# Patient Record
Sex: MALE | Race: WHITE | Employment: OTHER | URBAN - NONMETROPOLITAN AREA
[De-identification: names, ages, dates, MRNs, and addresses within clinical notes are randomized per-mention and may not be internally consistent; named-entity substitution may affect disease eponyms.]

---

## 2018-04-04 ENCOUNTER — HOSPITAL ENCOUNTER (EMERGENCY)
Facility: HOSPITAL | Age: 73
Discharge: ANOTHER HOSPITAL | End: 2018-04-05
Attending: INTERNAL MEDICINE | Admitting: INTERNAL MEDICINE
Payer: COMMERCIAL

## 2018-04-04 DIAGNOSIS — F03.91 DEMENTIA WITH BEHAVIORAL DISTURBANCE, UNSPECIFIED DEMENTIA TYPE: ICD-10-CM

## 2018-04-04 LAB
ALBUMIN SERPL-MCNC: 3.9 G/DL (ref 3.4–5)
ALBUMIN UR-MCNC: 10 MG/DL
ALP SERPL-CCNC: 92 U/L (ref 40–150)
ALT SERPL W P-5'-P-CCNC: 24 U/L (ref 0–70)
AMPHETAMINES UR QL SCN: NEGATIVE
ANION GAP SERPL CALCULATED.3IONS-SCNC: 10 MMOL/L (ref 3–14)
APPEARANCE UR: CLEAR
AST SERPL W P-5'-P-CCNC: 16 U/L (ref 0–45)
BACTERIA #/AREA URNS HPF: ABNORMAL /HPF
BARBITURATES UR QL: NEGATIVE
BASOPHILS # BLD AUTO: 0 10E9/L (ref 0–0.2)
BASOPHILS NFR BLD AUTO: 0.3 %
BENZODIAZ UR QL: NEGATIVE
BILIRUB SERPL-MCNC: 1.1 MG/DL (ref 0.2–1.3)
BILIRUB UR QL STRIP: NEGATIVE
BUN SERPL-MCNC: 20 MG/DL (ref 7–30)
CALCIUM SERPL-MCNC: 9.8 MG/DL (ref 8.5–10.1)
CANNABINOIDS UR QL SCN: NEGATIVE
CHLORIDE SERPL-SCNC: 100 MMOL/L (ref 94–109)
CO2 SERPL-SCNC: 25 MMOL/L (ref 20–32)
COCAINE UR QL: NEGATIVE
COLOR UR AUTO: ABNORMAL
CREAT SERPL-MCNC: 1.05 MG/DL (ref 0.66–1.25)
DIFFERENTIAL METHOD BLD: ABNORMAL
EOSINOPHIL # BLD AUTO: 0.2 10E9/L (ref 0–0.7)
EOSINOPHIL NFR BLD AUTO: 1.8 %
ERYTHROCYTE [DISTWIDTH] IN BLOOD BY AUTOMATED COUNT: 11.9 % (ref 10–15)
ETHANOL SERPL-MCNC: <0.01 G/DL
GFR SERPL CREATININE-BSD FRML MDRD: 69 ML/MIN/1.7M2
GLUCOSE SERPL-MCNC: 333 MG/DL (ref 70–99)
GLUCOSE UR STRIP-MCNC: >1000 MG/DL
HCT VFR BLD AUTO: 38.9 % (ref 40–53)
HGB BLD-MCNC: 14 G/DL (ref 13.3–17.7)
HGB UR QL STRIP: NEGATIVE
IMM GRANULOCYTES # BLD: 0 10E9/L (ref 0–0.4)
IMM GRANULOCYTES NFR BLD: 0.2 %
KETONES UR STRIP-MCNC: 10 MG/DL
LEUKOCYTE ESTERASE UR QL STRIP: NEGATIVE
LYMPHOCYTES # BLD AUTO: 2 10E9/L (ref 0.8–5.3)
LYMPHOCYTES NFR BLD AUTO: 21.2 %
MCH RBC QN AUTO: 31 PG (ref 26.5–33)
MCHC RBC AUTO-ENTMCNC: 36 G/DL (ref 31.5–36.5)
MCV RBC AUTO: 86 FL (ref 78–100)
METHADONE UR QL SCN: NEGATIVE
MONOCYTES # BLD AUTO: 0.7 10E9/L (ref 0–1.3)
MONOCYTES NFR BLD AUTO: 7 %
MUCOUS THREADS #/AREA URNS LPF: PRESENT /LPF
NEUTROPHILS # BLD AUTO: 6.5 10E9/L (ref 1.6–8.3)
NEUTROPHILS NFR BLD AUTO: 69.5 %
NITRATE UR QL: NEGATIVE
NRBC # BLD AUTO: 0 10*3/UL
NRBC BLD AUTO-RTO: 0 /100
OPIATES UR QL SCN: NEGATIVE
PCP UR QL SCN: NEGATIVE
PH UR STRIP: 5.5 PH (ref 4.7–8)
PLATELET # BLD AUTO: 201 10E9/L (ref 150–450)
POTASSIUM SERPL-SCNC: 4.2 MMOL/L (ref 3.4–5.3)
PROT SERPL-MCNC: 8 G/DL (ref 6.8–8.8)
RBC # BLD AUTO: 4.52 10E12/L (ref 4.4–5.9)
RBC #/AREA URNS AUTO: <1 /HPF (ref 0–2)
SODIUM SERPL-SCNC: 135 MMOL/L (ref 133–144)
SOURCE: ABNORMAL
SP GR UR STRIP: 1.02 (ref 1–1.03)
SPERM #/AREA URNS HPF: PRESENT /HPF
UROBILINOGEN UR STRIP-MCNC: NORMAL MG/DL (ref 0–2)
WBC # BLD AUTO: 9.3 10E9/L (ref 4–11)
WBC #/AREA URNS AUTO: 1 /HPF (ref 0–5)

## 2018-04-04 PROCEDURE — 25000132 ZZH RX MED GY IP 250 OP 250 PS 637: Performed by: INTERNAL MEDICINE

## 2018-04-04 PROCEDURE — 99285 EMERGENCY DEPT VISIT HI MDM: CPT | Performed by: INTERNAL MEDICINE

## 2018-04-04 PROCEDURE — 80307 DRUG TEST PRSMV CHEM ANLYZR: CPT | Performed by: INTERNAL MEDICINE

## 2018-04-04 PROCEDURE — 81001 URINALYSIS AUTO W/SCOPE: CPT | Mod: 59 | Performed by: INTERNAL MEDICINE

## 2018-04-04 PROCEDURE — 99285 EMERGENCY DEPT VISIT HI MDM: CPT

## 2018-04-04 PROCEDURE — 36415 COLL VENOUS BLD VENIPUNCTURE: CPT | Performed by: INTERNAL MEDICINE

## 2018-04-04 PROCEDURE — 85025 COMPLETE CBC W/AUTO DIFF WBC: CPT | Performed by: INTERNAL MEDICINE

## 2018-04-04 PROCEDURE — 80053 COMPREHEN METABOLIC PANEL: CPT | Performed by: INTERNAL MEDICINE

## 2018-04-04 PROCEDURE — 80320 DRUG SCREEN QUANTALCOHOLS: CPT | Performed by: INTERNAL MEDICINE

## 2018-04-04 RX ORDER — LORAZEPAM 1 MG/1
1 TABLET ORAL ONCE
Status: COMPLETED | OUTPATIENT
Start: 2018-04-04 | End: 2018-04-04

## 2018-04-04 RX ADMIN — LORAZEPAM 1 MG: 1 TABLET ORAL at 21:42

## 2018-04-04 NOTE — ED AVS SNAPSHOT
[facility]    [address] [place] [address]    Phone:  [phone]                                       Mike Ramos   MRN: [id number]    Department:  [facility]   Date of Visit:  4/4/2018           After Visit Summary Signature Page     I have received my discharge instructions, and my questions have been answered. I have discussed any challenges I see with this plan with the nurse or doctor.    ..........................................................................................................................................  Patient/Patient Representative Signature      ..........................................................................................................................................  Patient Representative Print Name and Relationship to Patient    ..................................................               ................................................  Date                                            Time    ..........................................................................................................................................  Reviewed by Signature/Title    ...................................................              ..............................................  Date                                                            Time

## 2018-04-04 NOTE — ED AVS SNAPSHOT
[facility]    [address] [place], [place] [address] [address]    Phone:  [phone]                                       Mike Ramos   MRN: [id number]    Department:  [facility]   Date of Visit:  4/4/2018           Patient Information     Date Of Birth          1945        Your diagnoses for this visit were:     Dementia with behavioral disturbance, unspecified dementia type     Uncontrolled type 2 diabetes mellitus without complication, without long-term current use of insulin (H)        You were seen by Ludin Villatoro MD.      Follow-up Information     Schedule an appointment as soon as possible for a visit with Clinic, [facility].    Contact information:    [address]  [facility] [address]  [phone]          Discharge Instructions         Understanding Dementia  Dementia is the name for a group of brain conditions that make it harder to remember, reason, and communicate. The most common form of dementia is Alzheimer disease. Other types include vascular dementia, frontotemporal dementia, and Lewy body dementia. Years ago, dementia was often called  senility.  It was even thought to be a normal part of aging. We now know that it s not normal. It s caused by ongoing damage to cells in the brain.    Symptoms of dementia  Symptoms differ depending on which parts of the brain are affected and the stage of the disease. The most common symptoms include:    Memory loss, including trouble with directions and familiar tasks    Language problems, such as trouble getting words out or understanding what is said    Difficulty with planning, organizing, concentration, and judgment. This includes people not being able to recognize their own symptoms.    Changes in behavior and personality  How dementia affects the brain  The brain controls all the workings of the mind and body. Some parts of the brain control memory and language. Other parts control movement and  coordination. With dementia, nerve cells in the brain are gradually damaged or destroyed. Why this happens is not yet clear. But over time, parts of the brain begin to atrophy (shrink). Brain atrophy often starts in the part of the brain that controls memory, reasoning, and personality. Other parts of the brain may not be affected until much later in the illness.  The stages of dementia  Dementia is a progressive disease. This means it gets worse over time. Symptoms differ for each person, but there are 3 basic stages. Each may last from months to years:    In the early stage, a person may seem forgetful, confused, or have changes in behavior. However, he or she may still be able to handle most tasks without help.    In the middle stage, more and more help is needed with daily tasks. A person may have trouble recognizing friends and family members, wander, or get lost in familiar places. He or she may also become restless or castle.    In the late stage, Alzheimer s can cause severe problems with memory, judgment, and other skills. Help is needed with nearly every aspect of daily life.  Treating dementia  At present, there s no cure for dementia. But with proper care, many people can live comfortably for years:     Medicines are a key part of treatment. Some types can help slow the progression of symptoms, such as memory loss. Others can help ease mood, behavior, and sleep problems. These medicines work for some people but not all.    Activity and exercise are good for body and mind. They may even help slow the progression of the disease. Simple, repetitive activities are good choices.    Regular healthcare provider visits help keep track of symptoms and overall health.    Sleep-wake cycle can be mixed up in patients with dementia. They may function better being up at nighttime and sleeping during the daytime.      Social interactions are important to maintain.    Date Last Reviewed: 10/7/2015    6380-6209 The  [employer]. [address] [place] [address]. All rights reserved. This information is not intended as a substitute for professional medical care. Always follow your healthcare professional's instructions.             Review of your medicines      Our records show that you are taking the medicines listed below. If these are incorrect, please call your family doctor or clinic.        Dose / Directions Last dose taken    CITALOPRAM HYDROBROMIDE PO   Dose:  10 mg        Take 10 mg by mouth daily   Refills:  0        insulin glargine 100 UNIT/ML injection   Commonly known as:  LANTUS        Inject Subcutaneous At Bedtime 15-25 units daily   Refills:  0        LISINOPRIL PO   Dose:  10 mg        Take 10 mg by mouth daily   Refills:  0        METFORMIN HCL PO   Dose:  1000 mg        Take 1,000 mg by mouth 2 times daily (with meals)   Refills:  0        METOPROLOL TARTRATE PO   Dose:  25 mg        Take 25 mg by mouth 2 times daily   Refills:  0        NITROSTAT SL   Dose:  0.4 mg        Place 0.4 mg under the tongue   Refills:  0        RISPERIDONE PO   Dose:  0.5 mg        Take 0.5 mg by mouth 2 times daily   Refills:  0        SIMVASTATIN PO   Dose:  20 mg        Take 20 mg by mouth At Bedtime   Refills:  0        SLO-NIACIN PO   Dose:  2000 mg        Take 2,000 mg by mouth At Bedtime   Refills:  0        VITAMIN D3 PO   Dose:  1000 Units        Take 1,000 Units by mouth daily   Refills:  0                Procedures and tests performed during your visit     Alcohol ethyl    CBC with platelets differential    Comprehensive metabolic panel    Drug Screen Urine (Range)    UA with Microscopic reflex to Culture      Orders Needing Specimen Collection     None      Pending Results     No orders found for last 3 day(s).            Pending Culture Results     No orders found for last 3 day(s).            Thank you for choosing [facility]       Thank you for choosing [facility] for your care. Our goal is always to  "provide you with excellent care. Hearing back from our patients is one way we can continue to improve our services. Please take a few minutes to complete the written survey that you may receive in the mail after you visit with us. Thank you!        MyPermissions Information     MyPermissions lets you send messages to your doctor, view your test results, renew your prescriptions, schedule appointments and more. To sign up, go to [email or web address] . Click on \"Log in\" on the left side of the screen, which will take you to the Welcome page. Then click on \"Sign up Now\" on the right side of the page.     You will be asked to enter the access code listed below, as well as some personal information. Please follow the directions to create your username and password.     Your access code is: [email or web address]  Expires: 7/3/2018 11:53 PM     Your access code will  in 90 days. If you need help or a new code, please call your [facility] clinic or [phone].        Care EveryWhere ID     This is your Care EveryWhere ID. This could be used by other organizations to access your [facility] medical records  [id number]        Equal Access to Services     ESTEFANIA AZEVEDO : Hadii reny Ortiz, wanilda abad, qalyndon kaalmajuan cobb, jovanna freedman. So [facility] [phone].    ATENCIÓN: Si habla español, tiene a calvert disposición servicios gratuitos de asistencia lingüística. Ghassan al [phone].    We comply with applicable federal civil rights laws and [place] laws. We do not discriminate on the basis of race, color, national origin, age, disability, sex, sexual orientation, or gender identity.            After Visit Summary       This is your record. Keep this with you and show to your community pharmacist(s) and doctor(s) at your next visit.                  "

## 2018-04-05 ENCOUNTER — TRANSFERRED RECORDS (OUTPATIENT)
Dept: HEALTH INFORMATION MANAGEMENT | Facility: CLINIC | Age: 73
End: 2018-04-05

## 2018-04-05 VITALS
OXYGEN SATURATION: 97 % | SYSTOLIC BLOOD PRESSURE: 128 MMHG | RESPIRATION RATE: 16 BRPM | DIASTOLIC BLOOD PRESSURE: 71 MMHG | HEART RATE: 68 BPM | TEMPERATURE: 97.9 F

## 2018-04-05 LAB
GLUCOSE BLDC GLUCOMTR-MCNC: 271 MG/DL (ref 70–99)
GLUCOSE BLDC GLUCOMTR-MCNC: 279 MG/DL (ref 70–99)
GLUCOSE BLDC GLUCOMTR-MCNC: 290 MG/DL (ref 70–99)

## 2018-04-05 PROCEDURE — 00000146 ZZHCL STATISTIC GLUCOSE BY METER IP

## 2018-04-05 RX ORDER — ALUMINA, MAGNESIA, AND SIMETHICONE 2400; 2400; 240 MG/30ML; MG/30ML; MG/30ML
30 SUSPENSION ORAL ONCE
Status: DISCONTINUED | OUTPATIENT
Start: 2018-04-05 | End: 2018-04-05

## 2018-04-05 ASSESSMENT — ENCOUNTER SYMPTOMS
DISORGANIZED SPEECH: 0
DIAPHORESIS: 0
DIZZINESS: 0
WHEEZING: 0
BACK PAIN: 0
SLEEP DISTURBANCE: 0
HEADACHES: 0
WEAKNESS: 0
VOMITING: 0
BLOOD IN STOOL: 0
HALLUCINATIONS: 0
NECK PAIN: 0
NERVOUS/ANXIOUS: 1
FLANK PAIN: 0
NAUSEA: 0
ANAL BLEEDING: 0
HOMICIDAL IDEAS: 0
FEVER: 0
ABDOMINAL PAIN: 0
LIGHT-HEADEDNESS: 0
CHILLS: 0
PALPITATIONS: 0
SHORTNESS OF BREATH: 0
NUMBNESS: 0
COUGH: 0
COLOR CHANGE: 0
FREQUENCY: 0
VOICE CHANGE: 0
CONFUSION: 0
AGITATION: 1
DYSURIA: 0
MYALGIAS: 0
DEPRESSED MOOD: 0
PARANOIA: 1
CHEST TIGHTNESS: 0
ABDOMINAL DISTENTION: 0
AGGRESSION: 1

## 2018-04-05 NOTE — ED NOTES
Bed: ED06  Expected date:   Expected time:   Means of arrival:   Comments:  [facility]

## 2018-04-05 NOTE — ED NOTES
This writer [facility] a [facility] Fax number [phone]

## 2018-04-05 NOTE — ED NOTES
This writer faxed a tranfser summary, face sheet, and run report to [facility] to see if they can take the pt .

## 2018-04-05 NOTE — ED NOTES
[facility] stating that [facility] to [facility] to their facility d/t not having a bed suitable for pt. Central Intake and DEC notified on the need for admission.

## 2018-04-05 NOTE — ED PROVIDER NOTES
Patient accepted in the Reflections unit at [facility] for medication adjustment and care.      See Dr. Villatoro's note for diagnoses.     Jessica Proctor MD  04/05/18 9349

## 2018-04-05 NOTE — PROGRESS NOTES
Referral received.  Patient admitted due to agitation and non managed behavior at home.  A DEC assessement and identified no need for a mental health admission.  Wife returned and response and behavior became unmanageable.  He remained here.  Call placed to referral line and I [facility]  [facility].  I did review this patient with central intake, Nisha.  I did speak directly with Alex, the concern that remains for this particular patient is the inability to participate in groups and his vulnerability within the unit.  I did hear back from [facility]isha who did speak with [facility] who also denied for the same reasons.  I have called Chelsea-to the reflections unit, information referred to Jolynn.  His [facility] would [facility] to the [facility] per case management.  This however does not mean there is coverage.  I spoke and sent information to Kirk at [facility].  I was told that he would have his   There is the plan this patient is able to go to  A skilled contracted facility.  [facility] is [facility].    Continue to try to refer and transfer.
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
## 2018-04-05 NOTE — ED NOTES
I called and talked with wife about  being discharged to home and she states that she doesn't think he should be discharged and I told her that I would try and talk with the DEC  that talked with her  and then I would call her back.  I then tried calling the  and they state that she has gone home for the night.

## 2018-04-05 NOTE — ED NOTES
Went to explain to patient about DEC.  Patient upset and does not want anyone caring or talking to him from [place].  Patient said he wants to go home or stay the night here and wait for a white doctor to care for him in the morning.  Patient was yelling.  Condition reported to Dr. Villatoro.  Wait for DEC to assess patient.

## 2018-04-05 NOTE — ED NOTES
Call is made to [facility] regarding possible room for this patient. Did give history of patient to staff and she states that she would pass this on to their admitting staff and see if they have a room available or if they would be a good place for him to go. They did state that they are no a locked unit and patient is free to walk around. She also states that they would need to talk to the patient over the phone before they could make a decision. Plan is to wait until morning since patient is currently resting and they are passing along the information. Provider updated.

## 2018-04-05 NOTE — PROGRESS NOTES
I did talk with patient to share with him where we are in the status of continued care.  Lunch was ordered for him.  I have heard back from [facility] and provided additional numbers for her to transition care.  Tigre on the phone for a nurse to nurse update.
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
## 2018-04-05 NOTE — DISCHARGE INSTRUCTIONS
Understanding Dementia  Dementia is the name for a group of brain conditions that make it harder to remember, reason, and communicate. The most common form of dementia is Alzheimer disease. Other types include vascular dementia, frontotemporal dementia, and Lewy body dementia. Years ago, dementia was often called  senility.  It was even thought to be a normal part of aging. We now know that it s not normal. It s caused by ongoing damage to cells in the brain.    Symptoms of dementia  Symptoms differ depending on which parts of the brain are affected and the stage of the disease. The most common symptoms include:    Memory loss, including trouble with directions and familiar tasks    Language problems, such as trouble getting words out or understanding what is said    Difficulty with planning, organizing, concentration, and judgment. This includes people not being able to recognize their own symptoms.    Changes in behavior and personality  How dementia affects the brain  The brain controls all the workings of the mind and body. Some parts of the brain control memory and language. Other parts control movement and coordination. With dementia, nerve cells in the brain are gradually damaged or destroyed. Why this happens is not yet clear. But over time, parts of the brain begin to atrophy (shrink). Brain atrophy often starts in the part of the brain that controls memory, reasoning, and personality. Other parts of the brain may not be affected until much later in the illness.  The stages of dementia  Dementia is a progressive disease. This means it gets worse over time. Symptoms differ for each person, but there are 3 basic stages. Each may last from months to years:    In the early stage, a person may seem forgetful, confused, or have changes in behavior. However, he or she may still be able to handle most tasks without help.    In the middle stage, more and more help is needed with daily tasks. A person may have  trouble recognizing friends and family members, wander, or get lost in familiar places. He or she may also become restless or castle.    In the late stage, Alzheimer s can cause severe problems with memory, judgment, and other skills. Help is needed with nearly every aspect of daily life.  Treating dementia  At present, there s no cure for dementia. But with proper care, many people can live comfortably for years:     Medicines are a key part of treatment. Some types can help slow the progression of symptoms, such as memory loss. Others can help ease mood, behavior, and sleep problems. These medicines work for some people but not all.    Activity and exercise are good for body and mind. They may even help slow the progression of the disease. Simple, repetitive activities are good choices.    Regular healthcare provider visits help keep track of symptoms and overall health.    Sleep-wake cycle can be mixed up in patients with dementia. They may function better being up at nighttime and sleeping during the daytime.      Social interactions are important to maintain.    Date Last Reviewed: 10/7/2015    2868-7484 [employer]. [address] [place] [address], [place] [address]. All rights reserved. This information is not intended as a substitute for professional medical care. Always follow your healthcare professional's instructions.

## 2018-04-05 NOTE — ED NOTES
has placed a note in the computer regarding her talking with patient, her talking with ER provider, and their plan. It also mentions that she had called and talked with wife.  I then call wife back and read then plan that they have listed in his chart about him having an appointment scheduled for the 11th and that he is to call them in the morning and see if they can have that appoinment sooner. Wife states understanding and that she will be on her way to  her .

## 2018-04-05 NOTE — PROGRESS NOTES
[facility] [place].  [facility]  Numbers provided to the receiving facility.

## 2018-04-05 NOTE — ED NOTES
Wife arrives and goes into room. Provider then goes into room to talk with wife and patient. Patient then starts yelling and provider comes back out of room and tells me he we will talk to wife in consult room. I then go down to room and ask wife to step out of patient room and she does. I then ask her to go to consult room and she agrees. I then walk her down to consult room and provider then goes in to talk with her. Security is near by also.

## 2018-04-05 NOTE — ED NOTES
Went in to talk with patient and update him that [facility] are [facility]. Instructed him that we sent the information they requested and we are waiting to hear back from them. He states understanding.

## 2018-04-05 NOTE — ED NOTES
Message left with [facility] in [place] to contact Dr. Villatoro regarding a possible referral to [facility].

## 2018-04-20 ENCOUNTER — TRANSFERRED RECORDS (OUTPATIENT)
Dept: HEALTH INFORMATION MANAGEMENT | Facility: CLINIC | Age: 73
End: 2018-04-20

## 2018-04-25 ENCOUNTER — TELEPHONE (OUTPATIENT)
Dept: FAMILY MEDICINE | Facility: OTHER | Age: 73
End: 2018-04-25

## 2018-04-25 NOTE — TELEPHONE ENCOUNTER
[facility] Fine.  [phone].  Patient had followed with him for quite some time.  History of PTSD, lability, outbursts.  Was hospitalized, then placed in nursing home - [facility].  Had been with wife prior.  Currently on Duloxetine and Risperdal.  Prior Depakote not helpful.  Dr. Fine suggested Prazosin if behaviors recur.  Felt he may do better in structured environment.  Patient decided he did not want to see him anymore.  Doctor is available if needed.  I have not yet met patient.  Will be rounding next month.

## 2018-04-27 VITALS
RESPIRATION RATE: 18 BRPM | DIASTOLIC BLOOD PRESSURE: 78 MMHG | OXYGEN SATURATION: 100 % | SYSTOLIC BLOOD PRESSURE: 122 MMHG | TEMPERATURE: 97.7 F | HEART RATE: 78 BPM

## 2018-04-27 PROBLEM — F02.818 DEMENTIA DUE TO MEDICAL CONDITION WITH BEHAVIORAL DISTURBANCE (H): Status: ACTIVE | Noted: 2018-04-27

## 2018-04-27 PROBLEM — I10 BENIGN ESSENTIAL HYPERTENSION: Status: ACTIVE | Noted: 2018-04-27

## 2018-04-27 RX ORDER — TRAZODONE HYDROCHLORIDE 50 MG/1
50 TABLET, FILM COATED ORAL AT BEDTIME
COMMUNITY
End: 2018-06-08

## 2018-04-27 RX ORDER — BISACODYL 10 MG
10 SUPPOSITORY, RECTAL RECTAL DAILY PRN
COMMUNITY

## 2018-05-01 VITALS
BODY MASS INDEX: 31.38 KG/M2 | OXYGEN SATURATION: 95 % | HEIGHT: 61 IN | SYSTOLIC BLOOD PRESSURE: 138 MMHG | WEIGHT: 166.2 LBS | TEMPERATURE: 96 F | DIASTOLIC BLOOD PRESSURE: 72 MMHG | HEART RATE: 72 BPM | RESPIRATION RATE: 16 BRPM

## 2018-05-01 PROBLEM — E10.65 TYPE 1 DIABETES MELLITUS WITH HYPERGLYCEMIA (H): Status: ACTIVE | Noted: 2018-05-01

## 2018-05-01 PROBLEM — I63.40: Status: ACTIVE | Noted: 2018-05-01

## 2018-05-01 PROBLEM — F22: Status: ACTIVE | Noted: 2018-05-01

## 2018-05-01 PROBLEM — R45.1 RESTLESSNESS AND AGITATION: Status: ACTIVE | Noted: 2018-05-01

## 2018-05-01 PROBLEM — Z77.098 EXPOSURE TO TOXIC CHEMICAL: Status: ACTIVE | Noted: 2018-05-01

## 2018-05-01 PROBLEM — R45.6 VIOLENT BEHAVIOR: Status: ACTIVE | Noted: 2018-05-01

## 2018-05-01 RX ORDER — DIVALPROEX SODIUM 125 MG/1
125 TABLET, DELAYED RELEASE ORAL 3 TIMES DAILY
COMMUNITY
End: 2018-09-10

## 2018-05-08 ENCOUNTER — NURSING HOME VISIT (OUTPATIENT)
Dept: FAMILY MEDICINE | Facility: OTHER | Age: 73
End: 2018-05-08
Payer: COMMERCIAL

## 2018-05-08 DIAGNOSIS — Z78.9 NURSING HOME RESIDENT: Primary | ICD-10-CM

## 2018-05-08 DIAGNOSIS — F02.818 DEMENTIA DUE TO MEDICAL CONDITION WITH BEHAVIORAL DISTURBANCE (H): ICD-10-CM

## 2018-05-08 PROCEDURE — 99304 1ST NF CARE SF/LOW MDM 25: CPT | Performed by: FAMILY MEDICINE

## 2018-05-08 NOTE — MR AVS SNAPSHOT
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          Follow-ups after your visit        Who to contact     If you have questions or need follow up information about today's clinic visit or your schedule please contact [facility] directly at [phone].  Normal or non-critical lab and imaging results will be communicated to you by [employer], letter or phone within 4 business days after the clinic has received the results. If you do not hear from us within 7 days, please contact the clinic through [employer] or phone. If you have a critical or abnormal lab result, we will notify you by phone as soon as possible.  Submit refill requests through [employer] or call your pharmacy and they will forward the refill request to us. Please allow 3 business days for your refill to be completed.          Additional Information About Your Visit        MyChart Information     [employer] lets you send messages to your doctor, view your test results, renew your prescriptions, schedule appointments and more. To sign up, go to [email or web address] . Click on \"Log in\" on the left side of the screen, which will take you to the Welcome page. Then click on \"Sign up Now\" on the right side of the page.     You will be asked to enter the access code listed below, as well as some personal information. Please follow the directions to create your username and password.     Your access code is: [email or web address]  Expires: 7/3/2018 11:53 PM     Your access code will  in 90 days. If you need help or a new code, please call your [facility] or " "[phone].        Care EveryWhere ID     This is your Care EveryWhere ID. This could be used by other organizations to access your [place] medical records  [id number]        Your Vitals Were     Pulse Temperature Respirations Height Pulse Oximetry BMI (Body Mass Index)    72 96  F (35.6  C) 16 5' 1\" (1.549 m) 95% 31.4 kg/m2       Blood Pressure from Last 3 Encounters:   05/01/18 138/72   04/06/18 122/78   04/05/18 128/71    Weight from Last 3 Encounters:   05/01/18 166 lb 3.2 oz (75.4 kg)   11/12/14 165 lb (74.8 kg)   06/20/14 160 lb (72.6 kg)              Today, you had the following     No orders found for display         Today's Medication Changes          These changes are accurate as of 5/8/18  9:48 PM.  If you have any questions, ask your nurse or doctor.               Stop taking these medicines if you haven't already. Please contact your care team if you have questions.     ACETAMINOPHEN PO           LISINOPRIL PO                    Primary Care Provider Office Phone # Fax #    Maki URIOSTEGUI Sushant [phone]       [facility] [address] [address]        Equal Access to Services     ESTEFANIA AZEVEDO AH: Hadii reny johansen hadasho Soomaali, waaxda luqadaha, qaybta kaalmada adeegyada, jovanna freedman. So [facility] [phone].    ATENCIÓN: Si habla español, tiene a calvert disposición servicios gratuitos de asistencia lingüística. Llame al [phone].    We comply with applicable federal civil rights laws and [place] laws. We do not discriminate on the basis of race, color, national origin, age, disability, sex, sexual orientation, or gender identity.            Thank you!     Thank you for choosing [facility]  for your care. Our goal is always to provide you with excellent care. Hearing back from our patients is one way we can continue to improve our services. Please take a few minutes to complete the written survey that you may receive " in the mail after your visit with us. Thank you!             Your Updated Medication List - Protect others around you: Learn how to safely use, store and throw away your medicines at www.disposemymeds.org.          This list is accurate as of 5/8/18  9:48 PM.  Always use your most recent med list.                   Brand Name Dispense Instructions for use Diagnosis    bisacodyl 10 MG Suppository    DULCOLAX     Place 10 mg rectally daily as needed for constipation        CITALOPRAM HYDROBROMIDE PO      Take 10 mg by mouth daily        divalproex sodium delayed-release 125 MG DR tablet    DEPAKOTE     Take 125 mg by mouth 3 times daily        * DULOXETINE HCL PO      Take 60 mg by mouth        * DULOXETINE HCL PO      Take 90 mg by mouth daily        GABAPENTIN PO      Take 50 mg by mouth every 2 hours as needed        insulin glargine 100 UNIT/ML injection    LANTUS     Inject Subcutaneous At Bedtime 15-25 units daily        magnesium hydroxide 400 MG/5ML suspension    MILK OF MAGNESIA     Take 30 mLs by mouth daily as needed for constipation or heartburn        METFORMIN HCL PO      Take 500 mg by mouth 2 times daily (with meals)        METOPROLOL TARTRATE PO      Take 25 mg by mouth 2 times daily        NovoLOG FLEXPEN 100 UNIT/ML injection   Generic drug:  insulin aspart           RISPERIDONE PO      Take 0.5 mg by mouth 2 times daily        SILDENAFIL CITRATE PO      Take 100 mg by mouth        SIMVASTATIN PO      Take 20 mg by mouth At Bedtime        SLO-NIACIN PO      Take 2,000 mg by mouth At Bedtime        SODIUM CHLORIDE PO      Inject 0.9 % into the vein as needed        traZODone 50 MG tablet    DESYREL     Take 50 mg by mouth At Bedtime        VITAMIN D3 PO      Take 1,000 Units by mouth daily        ZYPREXA PO      Inject 2.5 mg/L into the muscle every 4 hours as needed for agitation        * Notice:  This list has 2 medication(s) that are the same as other medications prescribed for you. Read the  directions carefully, and ask your doctor or other care provider to review them with you.

## 2018-05-09 NOTE — PROGRESS NOTES
HISTORY OF PRESENT ILLNESS:  Mike is a 72 year old male (1945)  resident of [facility]  who is being seen today for a routine 30 day follow up. Patient is new to facility.  Here with advanced dementia. Patient offers no other complaint.  Staff notes no other issues.    Current medications, allergies, and interdisciplinary care plan are reviewed.      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  Document previously scanned on 3-1-16.  Validation form completed and sent to be scanned.  Code Status needs to be updated to reflect choices in most recent ACP document. Confirmed/documented designated decision maker(s).  Added by Jyoti Lehman RN Advance Care Planning Liaison with [employer]                  Social History     Social History     Marital status:      Spouse name: N/A     Number of children: N/A     Years of education: N/A     Occupational History     Not on file.     Social History Main Topics     Smoking status: Never Smoker     Smokeless tobacco: Never Used      Alcohol use Yes      Comment: occationally     Drug use: No     Sexual activity: Not on file     Other Topics Concern     Not on file     Social History Narrative        Current Outpatient Prescriptions   Medication Sig     divalproex sodium delayed-release (DEPAKOTE) 125 MG DR tablet Take 125 mg by mouth 3 times daily     DULOXETINE HCL PO Take 60 mg by mouth     DULOXETINE HCL PO Take 90 mg by mouth daily     insulin glargine (LANTUS VIAL) 100 UNIT/ML soln Inject Subcutaneous At Bedtime 15-25 units daily     METFORMIN HCL PO Take 500 mg by mouth 2 times daily (with meals)      METOPROLOL TARTRATE PO Take 25 mg by mouth 2 times daily     RISPERIDONE PO Take 0.5 mg by mouth 2 times daily     bisacodyl (DULCOLAX) 10 MG Suppository Place 10 mg rectally daily as needed for constipation     Cholecalciferol (VITAMIN D3 PO) Take 1,000 Units by mouth daily     CITALOPRAM HYDROBROMIDE PO Take 10 mg by mouth daily     GABAPENTIN PO Take 50 mg by mouth every 2 hours as needed     insulin aspart (NOVOLOG FLEXPEN) 100 UNIT/ML injection      magnesium hydroxide (MILK OF MAGNESIA) 400 MG/5ML suspension Take 30 mLs by mouth daily as needed for constipation or heartburn     OLANZapine (ZYPREXA PO) Inject 2.5 mg/L into the muscle every 4 hours as needed for agitation     SILDENAFIL CITRATE PO Take 100 mg by mouth     SIMVASTATIN PO Take 20 mg by mouth At Bedtime     SLO-NIACIN PO Take 2,000 mg by mouth At Bedtime     SODIUM CHLORIDE PO Inject 0.9 % into the vein as needed     traZODone (DESYREL) 50 MG tablet Take 50 mg by mouth At Bedtime     No current facility-administered medications for this visit.        Allergies   Allergen Reactions     Gemfibrozil      Ibuprofen      Oxycodone      Pt gets confused and has behaviors     Penicillins Hives       I have reviewed the care plan and do agree with the plan.      ROS:  No chest pain, shortness of breath, fever, chills, headache, nausea, vomiting, dysuria, or changes in bowel habits.  " Appetite is fair.  No pain noted.    OBJECTIVE:  /72  Pulse 72  Temp 96  F (35.6  C)  Resp 16  Ht 5' 1\" (1.549 m)  Wt 166 lb 3.2 oz (75.4 kg)  SpO2 95%  BMI 31.4 kg/m2    GENERAL:  Chronically ill appearing, alert, and in no acute distress  RESP:  Lungs clear.  No rales, rhonchi, or wheezing  CV:  RRR.  S1 S2 with out murmur. No clicks or rubs.  ABDOMEN: Soft, nontender  SKIN:  Age-related changes.  No suspicious lesions or rashes.  PSYCH:  Mentation limited, affect flat, and orientated.  EXTREM:  No edema.        ASSESSMENT/ORDERS:  (Z59.3) Nursing home resident  (primary encounter diagnosis)    (F02.81) Dementia due to medical condition with behavioral disturbance  Comment: monitor for any escalation of behaviors; consider telepsych consult if needed      Above issues stable.  No changes in current medications or care plan.      Total time spent with patient visit was 15 min including patient visit, review of pertinent clinical information, and treatment plan.      Eleanor Moura      "

## 2018-05-10 ENCOUNTER — TELEPHONE (OUTPATIENT)
Dept: FAMILY MEDICINE | Facility: OTHER | Age: 73
End: 2018-05-10

## 2018-06-08 VITALS
HEIGHT: 61 IN | DIASTOLIC BLOOD PRESSURE: 70 MMHG | TEMPERATURE: 96.6 F | SYSTOLIC BLOOD PRESSURE: 140 MMHG | RESPIRATION RATE: 16 BRPM | WEIGHT: 165.4 LBS | OXYGEN SATURATION: 94 % | BODY MASS INDEX: 31.23 KG/M2 | HEART RATE: 55 BPM

## 2018-06-08 NOTE — NURSING NOTE
"Chief Complaint   Patient presents with     Care Coordination Nursing Home     [facility] rounds,  6/12/18       Initial /70  Pulse 55  Temp 96.6  F (35.9  C)  Resp 16  Ht 5' 1\" (1.549 m)  Wt 165 lb 6.4 oz (75 kg)  SpO2 94%  BMI 31.25 kg/m2 Estimated body mass index is 31.25 kg/(m^2) as calculated from the following:    Height as of this encounter: 5' 1\" (1.549 m).    Weight as of this encounter: 165 lb 6.4 oz (75 kg).  Medication Reconciliation: complete    Wen Ramesh LPN    "

## 2018-06-12 ENCOUNTER — NURSING HOME VISIT (OUTPATIENT)
Dept: FAMILY MEDICINE | Facility: OTHER | Age: 73
End: 2018-06-12

## 2018-06-12 DIAGNOSIS — Z53.9 ERRONEOUS ENCOUNTER--DISREGARD: Primary | ICD-10-CM

## 2018-06-12 NOTE — MR AVS SNAPSHOT
"              After Visit Summary   2018    Mike Ramos    MRN: [id number]           Patient Information     Date Of Birth          1945        Visit Information        Provider Department      2018 12:24 PM Eleanor Corrales MD [facility]        Today's Diagnoses     ERRONEOUS ENCOUNTER--DISREGARD    -  1       Follow-ups after your visit        Who to contact     If you have questions or need follow up information about today's clinic visit or your schedule please contact [facility] at [phone].  Normal or non-critical lab and imaging results will be communicated to you by [employer], letter or phone within 4 business days after the clinic has received the results. If you do not hear from us within 7 days, please contact the clinic through [employer] or phone. If you have a critical or abnormal lab result, we will notify you by phone as soon as possible.  Submit refill requests through [employer] or call your pharmacy and they will forward the refill request to us. Please allow 3 business days for your refill to be completed.          Additional Information About Your Visit        MyChart Information     [employer] lets you send messages to your doctor, view your test results, renew your prescriptions, schedule appointments and more. To sign up, go to [email or web address] . Click on \"Log in\" on the left side of the screen, which will take you to the Welcome page. Then click on \"Sign up Now\" on the right side of the page.     You will be asked to enter the access code listed below, as well as some personal information. Please follow the directions to create your username and password.     Your access code is: [email or web address]  Expires: 10/15/2018  5:41 PM     Your access code will  in 90 days. If you need help or a new code, please call your [place] clinic or [phone].        Care EveryWhere ID     This is your Care EveryWhere ID. This " "could be used by other organizations to access your [place] medical records  [id number]        Your Vitals Were     Pulse Temperature Respirations Height Pulse Oximetry BMI (Body Mass Index)    55 96.6  F (35.9  C) 16 5' 1\" (1.549 m) 94% 31.25 kg/m2       Blood Pressure from Last 3 Encounters:   09/10/18 140/78   07/13/18 115/71   07/03/18 115/71    Weight from Last 3 Encounters:   09/10/18 165 lb 11.2 oz (75.2 kg)   07/13/18 167 lb (75.8 kg)   07/03/18 167 lb (75.8 kg)              Today, you had the following     No orders found for display         Today's Medication Changes          These changes are accurate as of 6/12/18 11:59 PM.  If you have any questions, ask your nurse or doctor.               Stop taking these medicines if you haven't already. Please contact your care team if you have questions.     CITALOPRAM HYDROBROMIDE PO           GABAPENTIN PO           magnesium hydroxide 400 MG/5ML suspension   Commonly known as:  MILK OF MAGNESIA           NovoLOG FLEXPEN 100 UNIT/ML injection   Generic drug:  insulin aspart           SILDENAFIL CITRATE PO           SLO-NIACIN PO           SODIUM CHLORIDE PO           traZODone 50 MG tablet   Commonly known as:  DESYREL           VITAMIN D3 PO           ZYPREXA PO                    Primary Care Provider Office Phone # Fax #    Maki URIOSTEGUI Sushant [phone]       [facility] [address] [facility] [address]  [facility] [address]        Equal Access to Services     ESTEFANIA AZEVEDO AH: Hadii reny ku hadasho Somasonali, waaxda luqadaha, qaybta kaalmada adeegyada, jovanna freedman. So [facility] [phone].    ATENCIÓN: Si habla español, tiene a calvert disposición servicios gratuitos de asistencia lingüística. Llame al [phone].    We comply with applicable federal civil rights laws and [place] laws. We do not discriminate on the basis of race, color, national origin, age, disability, sex, sexual orientation, or gender identity.          "   Thank you!     Thank you for choosing [facility]  for your care. Our goal is always to provide you with excellent care. Hearing back from our patients is one way we can continue to improve our services. Please take a few minutes to complete the written survey that you may receive in the mail after your visit with us. Thank you!             Your Updated Medication List - Protect others around you: Learn how to safely use, store and throw away your medicines at www.disposemymeds.org.          This list is accurate as of 6/12/18 11:59 PM.  Always use your most recent med list.                   Brand Name Dispense Instructions for use Diagnosis    bisacodyl 10 MG Suppository    DULCOLAX     Place 10 mg rectally daily as needed for constipation        divalproex sodium delayed-release 125 MG DR tablet    DEPAKOTE     Take 125 mg by mouth 3 times daily        * DULOXETINE HCL PO      Take 30 mg by mouth        * DULOXETINE HCL PO      Take 90 mg by mouth daily        insulin glargine 100 UNIT/ML injection    LANTUS     Inject Subcutaneous At Bedtime 15-25 units daily        LISINOPRIL PO      Take 10 mg by mouth daily        METFORMIN HCL PO      Take 500 mg by mouth 2 times daily (with meals)        METOPROLOL TARTRATE PO      Take 25 mg by mouth 2 times daily        RISPERIDONE PO      Take 0.5 mg by mouth 2 times daily        SIMVASTATIN PO      Take 20 mg by mouth At Bedtime        * Notice:  This list has 2 medication(s) that are the same as other medications prescribed for you. Read the directions carefully, and ask your doctor or other care provider to review them with you.

## 2018-06-18 VITALS
HEART RATE: 55 BPM | OXYGEN SATURATION: 94 % | BODY MASS INDEX: 31.23 KG/M2 | WEIGHT: 165.4 LBS | SYSTOLIC BLOOD PRESSURE: 140 MMHG | RESPIRATION RATE: 16 BRPM | DIASTOLIC BLOOD PRESSURE: 70 MMHG | TEMPERATURE: 96.6 F | HEIGHT: 61 IN

## 2018-06-18 NOTE — NURSING NOTE
"Chief Complaint   Patient presents with     Self Injury     [facility] Rounds, 6/19/18       Initial /70  Pulse 55  Temp 96.6  F (35.9  C)  Resp 16  Ht 5' 1\" (1.549 m)  Wt 165 lb 6.4 oz (75 kg)  SpO2 94%  BMI 31.25 kg/m2 Estimated body mass index is 31.25 kg/(m^2) as calculated from the following:    Height as of this encounter: 5' 1\" (1.549 m).    Weight as of this encounter: 165 lb 6.4 oz (75 kg).  Medication Reconciliation: complete    Wen Ramesh LPN    "

## 2018-06-19 ENCOUNTER — NURSING HOME VISIT (OUTPATIENT)
Dept: FAMILY MEDICINE | Facility: OTHER | Age: 73
End: 2018-06-19
Payer: COMMERCIAL

## 2018-06-19 DIAGNOSIS — Z78.9 NURSING HOME RESIDENT: Primary | ICD-10-CM

## 2018-06-19 DIAGNOSIS — F02.818 DEMENTIA DUE TO MEDICAL CONDITION WITH BEHAVIORAL DISTURBANCE (H): ICD-10-CM

## 2018-06-19 PROCEDURE — 99308 SBSQ NF CARE LOW MDM 20: CPT | Performed by: FAMILY MEDICINE

## 2018-06-19 NOTE — MR AVS SNAPSHOT
"              After Visit Summary   2018    Mike Ramos    MRN: [id number]           Patient Information     Date Of Birth          1945        Visit Information        Provider Department      2018 7:52 AM [facility]        Today's Diagnoses     Nursing home resident    -  1    Dementia due to medical condition with behavioral disturbance        Uncontrolled type 2 diabetes mellitus with insulin therapy (H)          Care Instructions    NH resident.          Follow-ups after your visit        Who to contact     If you have questions or need follow up information about today's clinic visit or your schedule please contact [facility] directly at [phone].  Normal or non-critical lab and imaging results will be communicated to you by MyChart, letter or phone within 4 business days after the clinic has received the results. If you do not hear from us within 7 days, please contact the clinic through [employer] or phone. If you have a critical or abnormal lab result, we will notify you by phone as soon as possible.  Submit refill requests through [employer] or call your pharmacy and they will forward the refill request to us. Please allow 3 business days for your refill to be completed.          Additional Information About Your Visit        MyChart Information     [employer] lets you send messages to your doctor, view your test results, renew your prescriptions, schedule appointments and more. To sign up, go to [email or web address] . Click on \"Log in\" on the left side of the screen, which will take you to the Welcome page. Then click on \"Sign up Now\" on the right side of the page.     You will be asked to enter the access code listed below, as well as some personal information. Please follow the directions to create your username and password.     Your access code is: [email or web address]  Expires: 7/3/2018 11:53 PM     Your access code will  in 90 days. " "If you need help or a new code, please call your [place] clinic or [phone].        Care EveryWhere ID     This is your Care EveryWhere ID. This could be used by other organizations to access your [place] medical records  [id number]        Your Vitals Were     Pulse Temperature Respirations Height Pulse Oximetry BMI (Body Mass Index)    55 96.6  F (35.9  C) 16 5' 1\" (1.549 m) 94% 31.25 kg/m2       Blood Pressure from Last 3 Encounters:   06/18/18 140/70   06/08/18 140/70   05/01/18 138/72    Weight from Last 3 Encounters:   06/18/18 165 lb 6.4 oz (75 kg)   06/08/18 165 lb 6.4 oz (75 kg)   05/01/18 166 lb 3.2 oz (75.4 kg)              Today, you had the following     No orders found for display       Primary Care Provider Office Phone # Fax #    Maki URIOSTEGUI Sushant [phone]       [facility] [address] [facility] [address]  [facility] [address]        Equal Access to Services     [facility] Hospital[facility] : Hadii aad ku hadasho Soomaali, waaxda luqadaha, qaybta kaalmada adeegyajuan, jovanna avila . So [facility] [phone].    ATENCIÓN: Si habla español, tiene a calvert disposición servicios gratuitos de asistencia lingüística. Ghassan al [phone].    We comply with applicable federal civil rights laws and [place] laws. We do not discriminate on the basis of race, color, national origin, age, disability, sex, sexual orientation, or gender identity.            Thank you!     Thank you for choosing [facility]  for your care. Our goal is always to provide you with excellent care. Hearing back from our patients is one way we can continue to improve our services. Please take a few minutes to complete the written survey that you may receive in the mail after your visit with us. Thank you!             Your Updated Medication List - Protect others around you: Learn how to safely use, store and throw away your medicines at www.disposemymeds.org.          This list is accurate as of " 6/19/18  9:27 PM.  Always use your most recent med list.                   Brand Name Dispense Instructions for use Diagnosis    bisacodyl 10 MG Suppository    DULCOLAX     Place 10 mg rectally daily as needed for constipation        divalproex sodium delayed-release 125 MG DR tablet    DEPAKOTE     Take 125 mg by mouth 3 times daily        * DULOXETINE HCL PO      Take 60 mg by mouth        * DULOXETINE HCL PO      Take 90 mg by mouth daily        insulin glargine 100 UNIT/ML injection    LANTUS     Inject Subcutaneous At Bedtime 15-25 units daily        LISINOPRIL PO      Take 10 mg by mouth daily        METFORMIN HCL PO      Take 500 mg by mouth 2 times daily (with meals)        METOPROLOL TARTRATE PO      Take 25 mg by mouth 2 times daily        RISPERIDONE PO      Take 0.5 mg by mouth 2 times daily        SIMVASTATIN PO      Take 20 mg by mouth At Bedtime        * Notice:  This list has 2 medication(s) that are the same as other medications prescribed for you. Read the directions carefully, and ask your doctor or other care provider to review them with you.

## 2018-06-20 NOTE — PROGRESS NOTES
HISTORY OF PRESENT ILLNESS:  Mike is a 72 year old male (1945)  resident of [facility]  who is being seen today for a routine 30 day follow up. Patient is upset today, as there was an incident where apparently another resident defecated on the patients bed and floor.  It was not Mike.  It was cleaned up by staff before our visit.  Staff notes no other issues.    Current medications, allergies, and interdisciplinary care plan are reviewed.      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  Document previously scanned on 3-1-16.  Validation form completed and sent to be scanned.  Code Status needs to be updated to reflect choices in most recent ACP document. Confirmed/documented designated decision maker(s).  Added by Jyoti Lehman RN Advance Care Planning Liaison with [employer]                  Social History     Social History     Marital status:      Spouse name: N/A     Number of children: N/A     Years of education: N/A     Occupational History     Not on file.     Social  "History Main Topics     Smoking status: Never Smoker     Smokeless tobacco: Never Used     Alcohol use Yes      Comment: occationally     Drug use: No     Sexual activity: Not on file     Other Topics Concern     Not on file     Social History Narrative        Current Outpatient Prescriptions   Medication Sig     bisacodyl (DULCOLAX) 10 MG Suppository Place 10 mg rectally daily as needed for constipation     divalproex sodium delayed-release (DEPAKOTE) 125 MG DR tablet Take 125 mg by mouth 3 times daily     DULOXETINE HCL PO Take 90 mg by mouth daily     DULOXETINE HCL PO Take 60 mg by mouth     insulin glargine (LANTUS VIAL) 100 UNIT/ML soln Inject Subcutaneous At Bedtime 15-25 units daily     LISINOPRIL PO Take 10 mg by mouth daily     METFORMIN HCL PO Take 500 mg by mouth 2 times daily (with meals)      METOPROLOL TARTRATE PO Take 25 mg by mouth 2 times daily     RISPERIDONE PO Take 0.5 mg by mouth 2 times daily     SIMVASTATIN PO Take 20 mg by mouth At Bedtime     No current facility-administered medications for this visit.        Allergies   Allergen Reactions     Gemfibrozil      Ibuprofen      Oxycodone      Pt gets confused and has behaviors     Penicillins Hives       I have reviewed the care plan and do agree with the plan.      ROS:  No chest pain, shortness of breath, fever, chills, headache, nausea, vomiting, dysuria, or changes in bowel habits.  Appetite is fair.  No pain noted.      OBJECTIVE:  /70  Pulse 55  Temp 96.6  F (35.9  C)  Resp 16  Ht 5' 1\" (1.549 m)  Wt 165 lb 6.4 oz (75 kg)  SpO2 94%  BMI 31.25 kg/m2    GENERAL:  Chronically ill appearing, alert, and in no acute distress  RESP:  Lungs clear.  No rales, rhonchi, or wheezing  CV:  RRR.  S1 S2 without murmur. No clicks or rubs.  ABDOMEN: soft, non-tender  SKIN:  Age-related changes.  No suspicious lesions or rashes.  PSYCH:  Mentation intact, affect irritable, and orientated to self.  EXTREM:  Trace edema.  "         ASSESSMENT/ORDERS:  (Z59.3) Nursing home resident  (primary encounter diagnosis)  Comment: long term    (F02.81) Dementia due to medical condition with behavioral disturbance  Comment: no recent behavior issues  Plan: continue current treatment    (E11.65,  Z79.4) Uncontrolled type 2 diabetes mellitus with insulin therapy (H)  Comment: due for annual fasting labs  Plan: will obtain with next lab draw        Above issues stable.  No changes in current medications or care plan.      Total time spent with patient visit was 15 min including patient visit, review of pertinent clinical information, and treatment plan.      Eleanor Moura

## 2018-06-21 ENCOUNTER — TELEPHONE (OUTPATIENT)
Dept: FAMILY MEDICINE | Facility: OTHER | Age: 73
End: 2018-06-21

## 2018-07-03 VITALS
HEART RATE: 61 BPM | TEMPERATURE: 96.7 F | BODY MASS INDEX: 31.53 KG/M2 | SYSTOLIC BLOOD PRESSURE: 115 MMHG | WEIGHT: 167 LBS | HEIGHT: 61 IN | RESPIRATION RATE: 16 BRPM | DIASTOLIC BLOOD PRESSURE: 71 MMHG | OXYGEN SATURATION: 93 %

## 2018-07-03 PROBLEM — R26.2 DIFFICULTY IN WALKING, NOT ELSEWHERE CLASSIFIED: Status: ACTIVE | Noted: 2018-07-03

## 2018-07-03 PROBLEM — R41.89 OTHER SYMPTOMS AND SIGNS INVOLVING COGNITIVE FUNCTIONS AND AWARENESS: Status: ACTIVE | Noted: 2018-07-03

## 2018-07-03 PROBLEM — I25.2 OLD MYOCARDIAL INFARCTION: Status: ACTIVE | Noted: 2018-07-03

## 2018-07-03 PROBLEM — Z79.4 LONG TERM (CURRENT) USE OF INSULIN (H): Status: ACTIVE | Noted: 2018-07-03

## 2018-07-03 NOTE — NURSING NOTE
"Chief Complaint   Patient presents with     Care Coordination Nursing Home     [facility] Rounds, 7/10/18       Initial /71  Pulse 61  Temp 96.7  F (35.9  C)  Resp 16  Ht 5' 1\" (1.549 m)  Wt 167 lb (75.8 kg)  SpO2 93%  BMI 31.55 kg/m2 Estimated body mass index is 31.55 kg/(m^2) as calculated from the following:    Height as of this encounter: 5' 1\" (1.549 m).    Weight as of this encounter: 167 lb (75.8 kg).  Medication Reconciliation: complete    Wen Ramesh LPN    "

## 2018-07-10 ENCOUNTER — NURSING HOME VISIT (OUTPATIENT)
Dept: FAMILY MEDICINE | Facility: OTHER | Age: 73
End: 2018-07-10

## 2018-07-10 DIAGNOSIS — Z79.4 LONG TERM (CURRENT) USE OF INSULIN (H): ICD-10-CM

## 2018-07-10 DIAGNOSIS — Z53.9 ERRONEOUS ENCOUNTER--DISREGARD: Primary | ICD-10-CM

## 2018-07-10 DIAGNOSIS — R26.2 DIFFICULTY IN WALKING, NOT ELSEWHERE CLASSIFIED: ICD-10-CM

## 2018-07-10 DIAGNOSIS — R41.89 OTHER SYMPTOMS AND SIGNS INVOLVING COGNITIVE FUNCTIONS AND AWARENESS: ICD-10-CM

## 2018-07-10 DIAGNOSIS — I25.2 OLD MYOCARDIAL INFARCTION: ICD-10-CM

## 2018-07-10 NOTE — MR AVS SNAPSHOT
"              After Visit Summary   7/10/2018    Mike Ramos    MRN: [id number]           Patient Information     Date Of Birth          1945        Visit Information        Provider Department      7/10/2018 2:01 PM Eleanor Corrales MD [facility]        Today's Diagnoses     ERRONEOUS ENCOUNTER--DISREGARD    -  1    Other symptoms and signs involving cognitive functions and awareness                  Long term (current) use of insulin (H)        Old myocardial infarction           Follow-ups after your visit        Who to contact     If you have questions or need follow up information about today's clinic visit or your schedule please contact [facility] directly at [phone].  Normal or non-critical lab and imaging results will be communicated to you by MyChart, letter or phone within 4 business days after the clinic has received the results. If you do not hear from us within 7 days, please contact the clinic through MyChart or phone. If you have a critical or abnormal lab result, we will notify you by phone as soon as possible.  Submit refill requests through [employer] or call your pharmacy and they will forward the refill request to us. Please allow 3 business days for your refill to be completed.          Additional Information About Your Visit        MyChart Information     [employer] lets you send messages to your doctor, view your test results, renew your prescriptions, schedule appointments and more. To sign up, go to [email or web address] . Click on \"Log in\" on the left side of the screen, which will take you to the Welcome page. Then click on \"Sign up Now\" on the right side of the page.     You will be asked to enter the access code listed below, as well as some personal information. Please follow the directions to create your username and password.     Your access code is: [email or web address]  Expires: 10/15/2018  5:41 PM     Your access code will " " in 90 days. If you need help or a new code, please call your [place] clinic or [phone].        Care EveryWhere ID     This is your Care EveryWhere ID. This could be used by other organizations to access your [place] medical records  [id number]        Your Vitals Were     Pulse Temperature Respirations Height Pulse Oximetry BMI (Body Mass Index)    61 96.7  F (35.9  C) 16 5' 1\" (1.549 m) 93% 31.55 kg/m2       Blood Pressure from Last 3 Encounters:   09/10/18 140/78   18 115/71   18 115/71    Weight from Last 3 Encounters:   09/10/18 165 lb 11.2 oz (75.2 kg)   18 167 lb (75.8 kg)   18 167 lb (75.8 kg)              Today, you had the following     No orders found for display         Today's Medication Changes          These changes are accurate as of 7/10/18 11:59 PM.  If you have any questions, ask your nurse or doctor.               Stop taking these medicines if you haven't already. Please contact your care team if you have questions.     SIMVASTATIN PO                    Primary Care Provider Office Phone # Fax #    Maki URIOSTEGUI Sushant [phone]       [facility] [address] [address]        Equal Access to Services     ESTEFANIA AZEVEDO : Hadii reny johansen hadasho Soomaali, waaxda luqadaha, qaybta kaalmada adeegyada, jovanna avila . So [facility] [phone].    ATENCIÓN: Si habla español, tiene a calvert disposición servicios gratuitos de asistencia lingüística. Llame al [phone].    We comply with applicable federal civil rights laws and [place] laws. We do not discriminate on the basis of race, color, national origin, age, disability, sex, sexual orientation, or gender identity.            Thank you!     Thank you for choosing [facility]  for your care. Our goal is always to provide you with excellent care. Hearing back from our patients is one way we can continue to improve our services. " Please take a few minutes to complete the written survey that you may receive in the mail after your visit with us. Thank you!             Your Updated Medication List - Protect others around you: Learn how to safely use, store and throw away your medicines at www.disposemymeds.org.          This list is accurate as of 7/10/18 11:59 PM.  Always use your most recent med list.                   Brand Name Dispense Instructions for use Diagnosis    bisacodyl 10 MG Suppository    DULCOLAX     Place 10 mg rectally daily as needed for constipation        divalproex sodium delayed-release 125 MG DR tablet    DEPAKOTE     Take 125 mg by mouth 3 times daily        * DULOXETINE HCL PO      Take 30 mg by mouth        * DULOXETINE HCL PO      Take 90 mg by mouth daily        insulin glargine 100 UNIT/ML injection    LANTUS     Inject Subcutaneous At Bedtime 15-25 units daily        LISINOPRIL PO      Take 10 mg by mouth daily        METFORMIN HCL PO      Take 500 mg by mouth 2 times daily (with meals)        METOPROLOL TARTRATE PO      Take 25 mg by mouth 2 times daily        RISPERIDONE PO      Take 0.5 mg by mouth 2 times daily        * Notice:  This list has 2 medication(s) that are the same as other medications prescribed for you. Read the directions carefully, and ask your doctor or other care provider to review them with you.

## 2018-07-13 VITALS
BODY MASS INDEX: 31.53 KG/M2 | TEMPERATURE: 96.7 F | OXYGEN SATURATION: 93 % | RESPIRATION RATE: 16 BRPM | WEIGHT: 167 LBS | SYSTOLIC BLOOD PRESSURE: 115 MMHG | HEART RATE: 61 BPM | HEIGHT: 61 IN | DIASTOLIC BLOOD PRESSURE: 71 MMHG

## 2018-07-13 NOTE — NURSING NOTE
"Chief Complaint   Patient presents with     Care Coordination Nursing Home     [facility] rounds,  7/17/18       Initial /71  Pulse 61  Temp 96.7  F (35.9  C)  Resp 16  Ht 5' 1\" (1.549 m)  Wt 167 lb (75.8 kg)  SpO2 93%  BMI 31.55 kg/m2 Estimated body mass index is 31.55 kg/(m^2) as calculated from the following:    Height as of this encounter: 5' 1\" (1.549 m).    Weight as of this encounter: 167 lb (75.8 kg).  Medication Reconciliation: complete    Wen Ramesh LPN    "

## 2018-07-17 ENCOUNTER — NURSING HOME VISIT (OUTPATIENT)
Dept: FAMILY MEDICINE | Facility: OTHER | Age: 73
End: 2018-07-17
Payer: COMMERCIAL

## 2018-07-17 DIAGNOSIS — Z78.9 NURSING HOME RESIDENT: Primary | ICD-10-CM

## 2018-07-17 DIAGNOSIS — F02.818 DEMENTIA DUE TO MEDICAL CONDITION WITH BEHAVIORAL DISTURBANCE (H): ICD-10-CM

## 2018-07-17 DIAGNOSIS — I10 BENIGN ESSENTIAL HYPERTENSION: ICD-10-CM

## 2018-07-17 PROCEDURE — 99308 SBSQ NF CARE LOW MDM 20: CPT | Performed by: FAMILY MEDICINE

## 2018-07-17 NOTE — MR AVS SNAPSHOT
"              After Visit Summary   7/17/2018    Mike Ramos    MRN: [id number]           Patient Information     Date Of Birth          1945        Visit Information        Provider Department      7/17/2018 11:29 AM Eleanor Corrales MD [facility]        Today's Diagnoses     Nursing home resident    -  1    Dementia due to medical condition with behavioral disturbance        Uncontrolled type 2 diabetes mellitus with insulin therapy (H)        Benign essential hypertension          Care Instructions    NH          Follow-ups after your visit        Who to contact     If you have questions or need follow up information about today's clinic visit or your schedule please contact [facility] directly at [phone].  Normal or non-critical lab and imaging results will be communicated to you by MyChart, letter or phone within 4 business days after the clinic has received the results. If you do not hear from us within 7 days, please contact the clinic through MyChart or phone. If you have a critical or abnormal lab result, we will notify you by phone as soon as possible.  Submit refill requests through [employer] or call your pharmacy and they will forward the refill request to us. Please allow 3 business days for your refill to be completed.          Additional Information About Your Visit        MyChart Information     [employer] lets you send messages to your doctor, view your test results, renew your prescriptions, schedule appointments and more. To sign up, go to [email or web address] . Click on \"Log in\" on the left side of the screen, which will take you to the Welcome page. Then click on \"Sign up Now\" on the right side of the page.     You will be asked to enter the access code listed below, as well as some personal information. Please follow the directions to create your username and password.     Your access code is: [email or web address]  Expires: 10/15/2018  5:41 PM     Your " "access code will  in 90 days. If you need help or a new code, please call your [place] clinic or [phone].        Care EveryWhere ID     This is your Care EveryWhere ID. This could be used by other organizations to access your [place] medical records  [id number]        Your Vitals Were     Pulse Temperature Respirations Height Pulse Oximetry BMI (Body Mass Index)    61 96.7  F (35.9  C) 16 5' 1\" (1.549 m) 93% 31.55 kg/m2       Blood Pressure from Last 3 Encounters:   18 115/71   18 115/71   18 140/70    Weight from Last 3 Encounters:   18 167 lb (75.8 kg)   18 167 lb (75.8 kg)   18 165 lb 6.4 oz (75 kg)              Today, you had the following     No orders found for display       Primary Care Provider Office Phone # Fax #    Maki URIOSTEGUI Sushant [phone]       [facility] [address]  [facility] [address]        Equal Access to Services     [facility] : Hadii aad ku hadasho Somasonali, waaxda luqadaha, qaybta kaalmada adepako, jovanna avila . So [facility] [phone].    ATENCIÓN: Si habla español, tiene a calvert disposición servicios gratuitos de asistencia lingüística. Ghassan al [phone].    We comply with applicable federal civil rights laws and [place] laws. We do not discriminate on the basis of race, color, national origin, age, disability, sex, sexual orientation, or gender identity.            Thank you!     Thank you for choosing [facility]  for your care. Our goal is always to provide you with excellent care. Hearing back from our patients is one way we can continue to improve our services. Please take a few minutes to complete the written survey that you may receive in the mail after your visit with us. Thank you!             Your Updated Medication List - Protect others around you: Learn how to safely use, store and throw away your medicines at www.disposemymeds.org.          This " list is accurate as of 7/17/18  5:41 PM.  Always use your most recent med list.                   Brand Name Dispense Instructions for use Diagnosis    bisacodyl 10 MG Suppository    DULCOLAX     Place 10 mg rectally daily as needed for constipation        divalproex sodium delayed-release 125 MG DR tablet    DEPAKOTE     Take 125 mg by mouth 3 times daily        * DULOXETINE HCL PO      Take 30 mg by mouth        * DULOXETINE HCL PO      Take 90 mg by mouth daily        insulin glargine 100 UNIT/ML injection    LANTUS     Inject Subcutaneous At Bedtime 15-25 units daily        LISINOPRIL PO      Take 10 mg by mouth daily        METFORMIN HCL PO      Take 500 mg by mouth 2 times daily (with meals)        METOPROLOL TARTRATE PO      Take 25 mg by mouth 2 times daily        RISPERIDONE PO      Take 0.5 mg by mouth 2 times daily        * Notice:  This list has 2 medication(s) that are the same as other medications prescribed for you. Read the directions carefully, and ask your doctor or other care provider to review them with you.

## 2018-07-17 NOTE — PROGRESS NOTES
HISTORY OF PRESENT ILLNESS:  Mike is a 72 year old male (1945)  resident of [facility]  who is being seen today for a routine 30 day follow up.  Staff does report that at times he gets frustrated, angry, but no recent behavior issues. Patient offers no other complaint.  Staff notes no other issues.    Current medications, allergies, and interdisciplinary care plan are reviewed.      Patient Active Problem List    Diagnosis Date Noted     Other symptoms and signs involving cognitive functions and awareness 07/03/2018     Priority: Medium        07/03/2018     Priority: Medium     Long term (current) use of insulin (H) 07/03/2018     Priority: Medium     Old myocardial infarction 07/03/2018     Priority: Medium     Violent behavior 05/01/2018     Priority: Medium     Restlessness and agitation 05/01/2018     Priority: Medium     Delusional disorder, mixed type (H) 05/01/2018     Priority: Medium     Type 1 diabetes mellitus with hyperglycemia (H) 05/01/2018     Priority: Medium     Exposure to toxic chemical 05/01/2018     Priority: Medium     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  Document previously scanned on 3-1-16.  Validation form completed and sent to be scanned.  Code Status needs to be updated to reflect choices in most recent ACP document. Confirmed/documented designated decision maker(s).  Added by Jyoti Lehman RN Advance Care Planning Liaison with  "[employer]                  Social History     Social History     Marital status:      Spouse name: N/A     Number of children: N/A     Years of education: N/A     Occupational History     Not on file.     Social History Main Topics     Smoking status: Never Smoker     Smokeless tobacco: Never Used     Alcohol use Yes      Comment: occationally     Drug use: No     Sexual activity: Not on file     Other Topics Concern     Not on file     Social History Narrative        Current Outpatient Prescriptions   Medication Sig     bisacodyl (DULCOLAX) 10 MG Suppository Place 10 mg rectally daily as needed for constipation     divalproex sodium delayed-release (DEPAKOTE) 125 MG DR tablet Take 125 mg by mouth 3 times daily     DULOXETINE HCL PO Take 90 mg by mouth daily     DULOXETINE HCL PO Take 30 mg by mouth      insulin glargine (LANTUS VIAL) 100 UNIT/ML soln Inject Subcutaneous At Bedtime 15-25 units daily     LISINOPRIL PO Take 10 mg by mouth daily     METFORMIN HCL PO Take 500 mg by mouth 2 times daily (with meals)      METOPROLOL TARTRATE PO Take 25 mg by mouth 2 times daily     RISPERIDONE PO Take 0.5 mg by mouth 2 times daily     No current facility-administered medications for this visit.        Allergies   Allergen Reactions     Gemfibrozil      Ibuprofen      Oxycontin [Oxycodone]      Pt gets confused and has behaviors     Penicillins Hives       I have reviewed the care plan and do agree with the plan.      ROS:  No chest pain, shortness of breath, fever, chills, headache, nausea, vomiting, dysuria, or changes in bowel habits.  Appetite is good.  No pain noted.    OBJECTIVE:  /71  Pulse 61  Temp 96.7  F (35.9  C)  Resp 16  Ht 5' 1\" (1.549 m)  Wt 167 lb (75.8 kg)  SpO2 93%  BMI 31.55 kg/m2    GENERAL:  Chronically ill appearing, alert, and in no acute distress; resting in bed  RESP:  Lungs clear.  No rales, rhonchi, or wheezing  CV:  RRR.  S1 S2 without murmur. No clicks or " rubs.  ABDOMEN: soft, non-tender, non-distended  SKIN:  Age-related changes.  No suspicious lesions or rashes.  PSYCH:  Mentation intact, affect bright today.  EXTREM:  No edema.  Pulses intact.      ASSESSMENT/ORDERS:  (Z59.3) Nursing home resident  (primary encounter diagnosis)  Comment: long term  Plan: stable    (F02.81) Dementia due to medical condition with behavioral disturbance  Comment: no recent behaviors    (E11.65,  Z79.4) Uncontrolled type 2 diabetes mellitus with insulin therapy (H)  Comment: recent labs done in June; stable    (I10) Benign essential hypertension  Comment: stable    Above issues stable.  No changes in current medications or care plan.      Total time spent with patient visit was 15 min including patient visit, review of pertinent clinical information, and treatment plan.      Eleanor Moura

## 2018-07-23 ENCOUNTER — TELEPHONE (OUTPATIENT)
Dept: FAMILY MEDICINE | Facility: OTHER | Age: 73
End: 2018-07-23

## 2018-09-10 VITALS
OXYGEN SATURATION: 94 % | BODY MASS INDEX: 31.28 KG/M2 | DIASTOLIC BLOOD PRESSURE: 78 MMHG | HEART RATE: 68 BPM | SYSTOLIC BLOOD PRESSURE: 140 MMHG | HEIGHT: 61 IN | WEIGHT: 165.7 LBS | TEMPERATURE: 96.8 F | RESPIRATION RATE: 16 BRPM

## 2018-09-10 NOTE — NURSING NOTE
"Chief Complaint   Patient presents with     Care Coordination Nursing Home     9/11/18 [facility] Rounds.       Initial /78  Pulse 68  Temp 96.8  F (36  C)  Resp 16  Ht 5' 1\" (1.549 m)  Wt 165 lb 11.2 oz (75.2 kg)  SpO2 94%  BMI 31.31 kg/m2 Estimated body mass index is 31.31 kg/(m^2) as calculated from the following:    Height as of this encounter: 5' 1\" (1.549 m).    Weight as of this encounter: 165 lb 11.2 oz (75.2 kg).  Medication Reconciliation: complete    Wen Ramesh LPN    "

## 2018-09-11 ENCOUNTER — NURSING HOME VISIT (OUTPATIENT)
Dept: FAMILY MEDICINE | Facility: OTHER | Age: 73
End: 2018-09-11
Payer: COMMERCIAL

## 2018-09-11 DIAGNOSIS — Z78.9 NURSING HOME RESIDENT: Primary | ICD-10-CM

## 2018-09-11 DIAGNOSIS — F02.818 DEMENTIA DUE TO MEDICAL CONDITION WITH BEHAVIORAL DISTURBANCE (H): ICD-10-CM

## 2018-09-11 PROCEDURE — 99308 SBSQ NF CARE LOW MDM 20: CPT | Performed by: FAMILY MEDICINE

## 2018-09-11 NOTE — PROGRESS NOTES
HISTORY OF PRESENT ILLNESS:  Mike is a 72 year old male (1945)  resident of [facility]  who is being seen today for a routine 30 day follow up. Patient offers no other complaint.  Staff notes no other issues.    He is napping in bed, but easily awakened.  Denies any pain, dyspnea, chest pressure, change in appetite, or change in bowels.  Staff note that he can get upset when neighbor across the [place] is loud/yelling.    Current medications, allergies, and interdisciplinary care plan are reviewed.      Patient Active Problem List    Diagnosis Date Noted     Other symptoms and signs involving cognitive functions and awareness 07/03/2018     Priority: Medium        07/03/2018     Priority: Medium     Long term (current) use of insulin (H) 07/03/2018     Priority: Medium     Old myocardial infarction 07/03/2018     Priority: Medium     Violent behavior 05/01/2018     Priority: Medium     Restlessness and agitation 05/01/2018     Priority: Medium     Delusional disorder, mixed type (H) 05/01/2018     Priority: Medium     Type 1 diabetes mellitus with hyperglycemia (H) 05/01/2018     Priority: Medium     Exposure to toxic chemical 05/01/2018     Priority: Medium     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  Document previously scanned on 3-1-16.  Validation form completed and sent to be scanned.  Code Status needs to be updated to reflect choices in most recent ACP document.  "Confirmed/documented designated decision maker(s).  Added by Jyoti Lehman RN Advance Care Planning Liaison with [facility]                  Social History     Social History     Marital status:      Spouse name: N/A     Number of children: N/A     Years of education: N/A     Occupational History     Not on file.     Social History Main Topics     Smoking status: Never Smoker     Smokeless tobacco: Never Used     Alcohol use Yes      Comment: occationally     Drug use: No     Sexual activity: Not on file     Other Topics Concern     Not on file     Social History Narrative        Current Outpatient Prescriptions   Medication Sig     bisacodyl (DULCOLAX) 10 MG Suppository Place 10 mg rectally daily as needed for constipation     DULOXETINE HCL PO Take 90 mg by mouth daily     DULOXETINE HCL PO Take 30 mg by mouth      insulin glargine (LANTUS VIAL) 100 UNIT/ML soln Inject Subcutaneous At Bedtime 15-25 units daily     METFORMIN HCL PO Take 500 mg by mouth 2 times daily (with meals)      METOPROLOL TARTRATE PO Take 25 mg by mouth 2 times daily     RISPERIDONE PO Take 0.5 mg by mouth 2 times daily     No current facility-administered medications for this visit.        Allergies   Allergen Reactions     Gemfibrozil      Ibuprofen      Oxycontin [Oxycodone]      Pt gets confused and has behaviors     Penicillins Hives       I have reviewed the care plan and do agree with the plan.      ROS:  No chest pain, shortness of breath, fever, chills, headache, nausea, vomiting, dysuria, or changes in bowel habits.  Appetite is good.  No pain noted.    OBJECTIVE:  /78  Pulse 68  Temp 96.8  F (36  C)  Resp 16  Ht 5' 1\" (1.549 m)  Wt 165 lb 11.2 oz (75.2 kg)  SpO2 94%  BMI 31.31 kg/m2    GENERAL:  Chronically ill appearing, alert, and in no acute distress; lying diagonal in bed  RESP:  Lungs clear.  No rales, rhonchi, or wheezing  CV:  RRR.  S1 S2 without murmur. No clicks or rubs.  ABDOMEN: soft, " non-tender, non-distended  SKIN:  Age-related changes.  No suspicious lesions or rashes.  PSYCH:  Mentation intact, affect flat, and orientated to self.  EXTREM:  No edema.  Pulses palpable.        ASSESSMENT/ORDERS:  (Z59.3) Nursing home resident  (primary encounter diagnosis)  Comment: long term    Plan: continue plan    (F02.81) Dementia due to medical condition with behavioral disturbance  Comment: no recent changes or escalations  Plan: continue plan        Above issues stable.  No changes in current medications or care plan.      Total time spent with patient visit was 15 min including patient visit, review of pertinent clinical information, and treatment plan.      Eleanor Moura

## 2018-09-11 NOTE — MR AVS SNAPSHOT
"              After Visit Summary   2018    Mike Ramos    MRN: [id number]           Patient Information     Date Of Birth          1945        Visit Information        Provider Department      2018 1:17 PM Eleanor Corrales MD [facility]        Today's Diagnoses     Nursing home resident    -  1    Dementia due to medical condition with behavioral disturbance          Care Instructions    [facility] resident.          Follow-ups after your visit        Who to contact     If you have questions or need follow up information about today's clinic visit or your schedule please contact [facility] directly at [phone].  Normal or non-critical lab and imaging results will be communicated to you by airpimhart, letter or phone within 4 business days after the clinic has received the results. If you do not hear from us within 7 days, please contact the clinic through airpimhart or phone. If you have a critical or abnormal lab result, we will notify you by phone as soon as possible.  Submit refill requests through [employer] or call your pharmacy and they will forward the refill request to us. Please allow 3 business days for your refill to be completed.          Additional Information About Your Visit        MyChart Information     [employer] lets you send messages to your doctor, view your test results, renew your prescriptions, schedule appointments and more. To sign up, go to [email or web address] . Click on \"Log in\" on the left side of the screen, which will take you to the Welcome page. Then click on \"Sign up Now\" on the right side of the page.     You will be asked to enter the access code listed below, as well as some personal information. Please follow the directions to create your username and password.     Your access code is: [email or web address]  Expires: 10/15/2018  5:41 PM     Your access code will  in 90 days. If you need help or a new code, please call your [facility] or " "[phone].        Care EveryWhere ID     This is your Care EveryWhere ID. This could be used by other organizations to access your [facility] medical records  [id number]        Your Vitals Were     Pulse Temperature Respirations Height Pulse Oximetry BMI (Body Mass Index)    68 96.8  F (36  C) 16 5' 1\" (1.549 m) 94% 31.31 kg/m2       Blood Pressure from Last 3 Encounters:   09/10/18 140/78   07/13/18 115/71   07/03/18 115/71    Weight from Last 3 Encounters:   09/10/18 165 lb 11.2 oz (75.2 kg)   07/13/18 167 lb (75.8 kg)   07/03/18 167 lb (75.8 kg)              Today, you had the following     No orders found for display         Today's Medication Changes          These changes are accurate as of 9/11/18  4:14 PM.  If you have any questions, ask your nurse or doctor.               Stop taking these medicines if you haven't already. Please contact your care team if you have questions.     divalproex sodium delayed-release 125 MG DR tablet   Commonly known as:  DEPAKOTE           LISINOPRIL PO                    Primary Care Provider Office Phone # Fax #    Maki URIOSTEGUI Sushant [phone]       [facility] [address] [address]        Equal Access to Services     ESTEFANIA AZEVEDO AH: Hadii reny johansen hadasho Sonatalia, waaxda luqadaha, qaybta kaalmada adeegyada, jovanna freedman. So [facility] [phone].    ATENCIÓN: Si habla español, tiene a calvert disposición servicios gratuitos de asistencia lingüística. Llame al [phone].    We comply with applicable federal civil rights laws and [place] laws. We do not discriminate on the basis of race, color, national origin, age, disability, sex, sexual orientation, or gender identity.            Thank you!     Thank you for choosing [facility]  for your care. Our goal is always to provide you with excellent care. Hearing back from our patients is one way we can continue to improve our services. Please " take a few minutes to complete the written survey that you may receive in the mail after your visit with us. Thank you!             Your Updated Medication List - Protect others around you: Learn how to safely use, store and throw away your medicines at www.disposemymeds.org.          This list is accurate as of 9/11/18  4:14 PM.  Always use your most recent med list.                   Brand Name Dispense Instructions for use Diagnosis    bisacodyl 10 MG Suppository    DULCOLAX     Place 10 mg rectally daily as needed for constipation        * DULOXETINE HCL PO      Take 30 mg by mouth        * DULOXETINE HCL PO      Take 90 mg by mouth daily        insulin glargine 100 UNIT/ML injection    LANTUS     Inject Subcutaneous At Bedtime 15-25 units daily        METFORMIN HCL PO      Take 500 mg by mouth 2 times daily (with meals)        METOPROLOL TARTRATE PO      Take 25 mg by mouth 2 times daily        RISPERIDONE PO      Take 0.5 mg by mouth 2 times daily        * Notice:  This list has 2 medication(s) that are the same as other medications prescribed for you. Read the directions carefully, and ask your doctor or other care provider to review them with you.

## 2018-09-17 ENCOUNTER — TELEPHONE (OUTPATIENT)
Dept: FAMILY MEDICINE | Facility: OTHER | Age: 73
End: 2018-09-17

## 2022-10-04 PROBLEM — E11.65 TYPE 2 DIABETES MELLITUS WITH HYPERGLYCEMIA (H): Status: ACTIVE | Noted: 2018-04-27
